# Patient Record
Sex: FEMALE | Race: WHITE | NOT HISPANIC OR LATINO
[De-identification: names, ages, dates, MRNs, and addresses within clinical notes are randomized per-mention and may not be internally consistent; named-entity substitution may affect disease eponyms.]

---

## 2018-12-13 NOTE — PATIENT DISCUSSION
CATARACTS, OD: VISUALLY SIGNIFICANT. OPTION OF SURGERY VERSUS FOLLOWING VERSUS UPDATING GLASSES DISCUSSED. RBA'S DISCUSSED, PATIENT UNDERSTANDS AND DESIRES SURGERY TO INCREASE VISION FOR DRIVING AT NIGHT, READING AND WATCHING TV.  SCHEDULE CATARACT SURGERY/PRE-OP .

## 2018-12-13 NOTE — PATIENT DISCUSSION
CORNEAL SCAR, OD:  PRESCRIBED ARTIFICIAL TEARS AND UV PROTECTION. RETURN FOR FOLLOW-UP AS SCHEDULED.

## 2019-01-10 NOTE — PATIENT DISCUSSION
CATARACT SX OD 01/22/2019: RBA'S DISCUSSED, PATIENT UNDERSTANDS AND DESIRES TO PROCEED WITH SURGERY. CONSENT READ AND SIGNED. PATIENT DESIRES TORIC FOR Lorenzo mar.

## 2022-04-28 ENCOUNTER — NEW PATIENT (OUTPATIENT)
Dept: URBAN - METROPOLITAN AREA CLINIC 48 | Facility: CLINIC | Age: 57
End: 2022-04-28

## 2022-04-28 DIAGNOSIS — Z01.01: ICD-10-CM

## 2022-04-28 DIAGNOSIS — H52.4: ICD-10-CM

## 2022-04-28 DIAGNOSIS — H25.13: ICD-10-CM

## 2022-04-28 DIAGNOSIS — H40.013: ICD-10-CM

## 2022-04-28 DIAGNOSIS — H04.123: ICD-10-CM

## 2022-04-28 PROCEDURE — 92004 COMPRE OPH EXAM NEW PT 1/>: CPT

## 2022-04-28 PROCEDURE — 92015 DETERMINE REFRACTIVE STATE: CPT

## 2022-04-28 ASSESSMENT — KERATOMETRY
OS_AXISANGLE2_DEGREES: 80
OS_AXISANGLE_DEGREES: 170
OS_K2POWER_DIOPTERS: 47.00
OS_K1POWER_DIOPTERS: 45.25
OD_AXISANGLE2_DEGREES: 180
OD_K1POWER_DIOPTERS: 44.50
OD_AXISANGLE_DEGREES: 90
OD_K2POWER_DIOPTERS: 45.00

## 2022-04-28 ASSESSMENT — TONOMETRY
OS_IOP_MMHG: 14
OD_IOP_MMHG: 14

## 2022-04-28 ASSESSMENT — VISUAL ACUITY
OD_SC: 20/25+1
OD_SC: J3 @16IN
OD_CC: J1+ @16IN
OS_SC: 20/20-2
OU_CC: J1+ @16IN

## 2023-05-02 ENCOUNTER — COMPREHENSIVE EXAM (OUTPATIENT)
Dept: URBAN - METROPOLITAN AREA CLINIC 48 | Facility: LOCATION | Age: 58
End: 2023-05-02

## 2023-05-02 DIAGNOSIS — H04.123: ICD-10-CM

## 2023-05-02 DIAGNOSIS — H25.13: ICD-10-CM

## 2023-05-02 DIAGNOSIS — Z01.01: ICD-10-CM

## 2023-05-02 DIAGNOSIS — H40.013: ICD-10-CM

## 2023-05-02 DIAGNOSIS — H52.4: ICD-10-CM

## 2023-05-02 PROCEDURE — 92015 DETERMINE REFRACTIVE STATE: CPT

## 2023-05-02 PROCEDURE — 92014 COMPRE OPH EXAM EST PT 1/>: CPT

## 2023-05-02 ASSESSMENT — VISUAL ACUITY
OS_SC: 20/20
OU_SC: J2
OD_SC: 20/20-2

## 2023-05-02 ASSESSMENT — KERATOMETRY
OD_K1POWER_DIOPTERS: 44.50
OD_K2POWER_DIOPTERS: 45.00
OS_K1POWER_DIOPTERS: 45.25
OS_K2POWER_DIOPTERS: 47.00
OD_AXISANGLE2_DEGREES: 180
OD_AXISANGLE_DEGREES: 90
OS_AXISANGLE2_DEGREES: 80
OS_AXISANGLE_DEGREES: 170

## 2023-05-02 ASSESSMENT — TONOMETRY
OS_IOP_MMHG: 15
OD_IOP_MMHG: 15

## 2023-05-03 ENCOUNTER — DIAGNOSTICS ONLY (OUTPATIENT)
Dept: URBAN - METROPOLITAN AREA CLINIC 48 | Facility: LOCATION | Age: 58
End: 2023-05-03

## 2023-05-03 DIAGNOSIS — H40.013: ICD-10-CM

## 2023-05-03 PROCEDURE — 92133 CPTRZD OPH DX IMG PST SGM ON: CPT

## 2023-05-03 PROCEDURE — 76514 ECHO EXAM OF EYE THICKNESS: CPT

## 2023-05-03 PROCEDURE — 92083 EXTENDED VISUAL FIELD XM: CPT

## 2023-05-03 ASSESSMENT — KERATOMETRY
OD_K2POWER_DIOPTERS: 45.00
OS_K2POWER_DIOPTERS: 47.00
OS_AXISANGLE_DEGREES: 170
OD_AXISANGLE2_DEGREES: 180
OD_AXISANGLE_DEGREES: 90
OD_K1POWER_DIOPTERS: 44.50
OS_AXISANGLE2_DEGREES: 80
OS_K1POWER_DIOPTERS: 45.25

## 2024-09-24 ENCOUNTER — COMPREHENSIVE EXAM (OUTPATIENT)
Dept: URBAN - METROPOLITAN AREA CLINIC 53 | Facility: CLINIC | Age: 59
End: 2024-09-24

## 2024-09-24 DIAGNOSIS — H25.13: ICD-10-CM

## 2024-09-24 DIAGNOSIS — H04.123: ICD-10-CM

## 2024-09-24 DIAGNOSIS — H40.013: ICD-10-CM

## 2024-09-24 PROCEDURE — 99214 OFFICE O/P EST MOD 30 MIN: CPT
